# Patient Record
Sex: MALE | Race: WHITE
[De-identification: names, ages, dates, MRNs, and addresses within clinical notes are randomized per-mention and may not be internally consistent; named-entity substitution may affect disease eponyms.]

---

## 2021-03-25 ENCOUNTER — HOSPITAL ENCOUNTER (OUTPATIENT)
Dept: HOSPITAL 11 - JP.SDS | Age: 56
Discharge: HOME | End: 2021-03-25
Attending: SURGERY
Payer: MEDICARE

## 2021-03-25 DIAGNOSIS — E11.9: ICD-10-CM

## 2021-03-25 DIAGNOSIS — Z12.11: Primary | ICD-10-CM

## 2021-03-25 DIAGNOSIS — J44.9: ICD-10-CM

## 2021-03-25 DIAGNOSIS — I10: ICD-10-CM

## 2021-03-25 DIAGNOSIS — Z86.010: ICD-10-CM

## 2021-03-25 DIAGNOSIS — G47.33: ICD-10-CM

## 2021-03-25 DIAGNOSIS — Z80.0: ICD-10-CM

## 2021-03-25 DIAGNOSIS — E66.9: ICD-10-CM

## 2021-03-25 DIAGNOSIS — E78.5: ICD-10-CM

## 2021-03-25 DIAGNOSIS — K64.4: ICD-10-CM

## 2021-03-25 NOTE — OR
DATE OF PROCEDURE:  03/25/2021

 

SURGEON:  Jez Dunaway MD

 

PROCEDURE:  Colonoscopy.

 

FINDINGS:  Normal colonoscopy.

 

COMPLICATIONS:  None.

 

ASSISTANT:  None.

 

PREOPERATIVE DIAGNOSIS:  Family history of colorectal cancer.

 

POSTOPERATIVE DIAGNOSIS:  Family history of colorectal cancer.

 

RISKS:  Risks, benefits, alternatives, limitations including but not limited to infection,

bleeding, perforation, false positives, false negatives were explained to the patient who

wished to proceed.

 

PROCEDURE IN DETAIL:  The patient was placed in left lateral decubitus position.  Digital

rectal exam was performed without abnormality.  Scope was introduced and advanced

atraumatically to the ileocecal valve.  A photo was taken of this.  Scope was brought back

to the ascending, transverse, descending colon, and retroflexed.

 

No evidence of old or new blood.  No masses.  No polyps.  The prep was marginal,

approximately 85% to 90% of the luminal surface could be seen due to retained solid and

liquid stool.  No abnormalities on retroflexion.  No colitis.  It was noted that the patient

did have small external hemorrhoid.

 

 

 

 

Jez Dunaway MD

DD:  03/25/2021 10:06:34

DT:  03/25/2021 10:32:37

Job #:  [id number]

## 2022-08-23 ENCOUNTER — HOSPITAL ENCOUNTER (EMERGENCY)
Dept: HOSPITAL 11 - JP.ED | Age: 57
Discharge: HOME | End: 2022-08-23
Payer: MEDICARE

## 2022-08-23 DIAGNOSIS — Z79.4: ICD-10-CM

## 2022-08-23 DIAGNOSIS — Z79.899: ICD-10-CM

## 2022-08-23 DIAGNOSIS — R07.89: Primary | ICD-10-CM

## 2022-08-23 DIAGNOSIS — I10: ICD-10-CM

## 2022-08-23 DIAGNOSIS — E11.9: ICD-10-CM

## 2022-08-23 DIAGNOSIS — K21.9: ICD-10-CM

## 2022-08-23 PROCEDURE — 99285 EMERGENCY DEPT VISIT HI MDM: CPT

## 2022-08-23 PROCEDURE — 85610 PROTHROMBIN TIME: CPT

## 2022-08-23 PROCEDURE — 36415 COLL VENOUS BLD VENIPUNCTURE: CPT

## 2022-08-23 PROCEDURE — 93005 ELECTROCARDIOGRAM TRACING: CPT

## 2022-08-23 PROCEDURE — 71045 X-RAY EXAM CHEST 1 VIEW: CPT

## 2022-08-23 PROCEDURE — 85730 THROMBOPLASTIN TIME PARTIAL: CPT

## 2022-08-23 PROCEDURE — 80048 BASIC METABOLIC PNL TOTAL CA: CPT

## 2022-08-23 PROCEDURE — 84484 ASSAY OF TROPONIN QUANT: CPT

## 2022-08-23 PROCEDURE — 85025 COMPLETE CBC W/AUTO DIFF WBC: CPT
